# Patient Record
Sex: MALE | Race: WHITE | URBAN - METROPOLITAN AREA
[De-identification: names, ages, dates, MRNs, and addresses within clinical notes are randomized per-mention and may not be internally consistent; named-entity substitution may affect disease eponyms.]

---

## 2017-07-12 ENCOUNTER — HOSPITAL ENCOUNTER (OUTPATIENT)
Dept: MEDSURG UNIT | Facility: HOSPITAL | Age: 62
End: 2017-07-14
Attending: INTERNAL MEDICINE | Admitting: INTERNAL MEDICINE

## 2017-07-12 LAB
ABS NEUT (OLG): 9.2 X10(3)/MCL (ref 2.1–9.2)
ALBUMIN SERPL-MCNC: 3.5 GM/DL (ref 3.4–5)
ALBUMIN/GLOB SERPL: 1 RATIO (ref 1–2)
ALP SERPL-CCNC: 68 UNIT/L (ref 45–117)
ALT SERPL-CCNC: 15 UNIT/L (ref 12–78)
ANISOCYTOSIS BLD QL SMEAR: NORMAL
AST SERPL-CCNC: 14 UNIT/L (ref 15–37)
BASOPHILS # BLD AUTO: 0.05 X10(3)/MCL
BASOPHILS NFR BLD AUTO: 0 % (ref 0–1)
BILIRUB SERPL-MCNC: 0.4 MG/DL (ref 0.2–1)
BILIRUBIN DIRECT+TOT PNL SERPL-MCNC: 0.1 MG/DL
BILIRUBIN DIRECT+TOT PNL SERPL-MCNC: 0.3 MG/DL
BNP BLD-MCNC: 65.2 PG/ML (ref 0–100)
BUN SERPL-MCNC: 10 MG/DL (ref 7–18)
CALCIUM SERPL-MCNC: 8.3 MG/DL (ref 8.5–10.1)
CHLORIDE SERPL-SCNC: 106 MMOL/L (ref 98–107)
CK MB SERPL-MCNC: 1.3 NG/ML (ref 1–3.6)
CK SERPL-CCNC: 145 UNIT/L (ref 39–308)
CO2 SERPL-SCNC: 26 MMOL/L (ref 21–32)
CREAT SERPL-MCNC: 1 MG/DL (ref 0.6–1.3)
CROSSMATCH INTERPRETATION: NORMAL
EOSINOPHIL # BLD AUTO: 0.33 X10(3)/MCL
EOSINOPHIL NFR BLD AUTO: 3 % (ref 0–5)
ERYTHROCYTE [DISTWIDTH] IN BLOOD BY AUTOMATED COUNT: 18.3 % (ref 11.5–14.5)
FERRITIN SERPL-MCNC: 2.7 NG/ML (ref 26–388)
GLOBULIN SER-MCNC: 3 GM/ML (ref 2.3–3.5)
GLUCOSE SERPL-MCNC: 96 MG/DL (ref 74–106)
GROUP & RH: NORMAL
HCT VFR BLD AUTO: 22.6 % (ref 40–51)
HGB BLD-MCNC: 6 GM/DL (ref 13.5–17.5)
IMM GRANULOCYTES # BLD AUTO: 0.04 10*3/UL
IMM GRANULOCYTES NFR BLD AUTO: 0 %
INR PPP: 1.06 (ref 0.9–1.2)
IRON SATN MFR SERPL: 2.7 % (ref 15–50)
IRON SERPL-MCNC: 10 MCG/DL (ref 65–175)
LYMPHOCYTES # BLD AUTO: 1.33 X10(3)/MCL
LYMPHOCYTES NFR BLD AUTO: 11 % (ref 15–40)
MCH RBC QN AUTO: 17.2 PG (ref 26–34)
MCHC RBC AUTO-ENTMCNC: 26.5 GM/DL (ref 31–37)
MCV RBC AUTO: 64.9 FL (ref 80–100)
MICROCYTES BLD QL SMEAR: NORMAL
MONOCYTES # BLD AUTO: 0.88 X10(3)/MCL
MONOCYTES NFR BLD AUTO: 7 % (ref 4–12)
NEUTROPHILS # BLD AUTO: 9.2 X10(3)/MCL
NEUTROPHILS NFR BLD AUTO: 78 X10(3)/MCL
PLATELET # BLD AUTO: 267 X10(3)/MCL (ref 130–400)
PLATELET # BLD EST: ADEQUATE 10*3/UL
PMV BLD AUTO: 10.1 FL (ref 7.4–10.4)
POC TROPONIN: 0 NG/ML (ref 0–0.08)
POLYCHROMASIA BLD QL SMEAR: NORMAL
POTASSIUM SERPL-SCNC: 2.9 MMOL/L (ref 3.5–5.1)
PRODUCT READY: NORMAL
PROT SERPL-MCNC: 6.5 GM/DL (ref 6.4–8.2)
PROTHROMBIN TIME: 13.6 SECOND(S) (ref 11.9–14.4)
RBC # BLD AUTO: 3.48 X10(6)/MCL (ref 4.5–5.9)
RBC MORPH BLD: NORMAL
SODIUM SERPL-SCNC: 140 MMOL/L (ref 136–145)
T4 FREE SERPL-MCNC: 0.97 NG/DL (ref 0.76–1.46)
TIBC SERPL-MCNC: 368 MCG/DL (ref 250–450)
TRANSFERRIN SERPL-MCNC: 277 MG/DL (ref 200–360)
TSH SERPL-ACNC: 2.72 MIU/L (ref 0.36–3.74)
WBC # SPEC AUTO: 11.8 X10(3)/MCL (ref 4.5–11)

## 2017-07-13 LAB
ABS NEUT (OLG): 5.43 X10(3)/MCL (ref 2.1–9.2)
ABS NEUT (OLG): 5.67 X10(3)/MCL (ref 2.1–9.2)
AMPHET UR QL SCN: POSITIVE
APPEARANCE, UA: CLEAR
BACTERIA #/AREA URNS AUTO: ABNORMAL /[HPF]
BARBITURATE SCN PRESENT UR: NEGATIVE
BASOPHILS # BLD AUTO: 0.08 X10(3)/MCL
BASOPHILS # BLD AUTO: 0.08 X10(3)/MCL
BASOPHILS NFR BLD AUTO: 1 % (ref 0–1)
BASOPHILS NFR BLD AUTO: 1 % (ref 0–1)
BENZODIAZ UR QL SCN: NEGATIVE
BILIRUB UR QL STRIP: NEGATIVE
BUN SERPL-MCNC: 9 MG/DL (ref 7–18)
CALCIUM SERPL-MCNC: 7.8 MG/DL (ref 8.5–10.1)
CANNABINOIDS UR QL SCN: POSITIVE
CHLORIDE SERPL-SCNC: 111 MMOL/L (ref 98–107)
CO2 SERPL-SCNC: 26 MMOL/L (ref 21–32)
COCAINE UR QL SCN: NEGATIVE
COLOR UR: ABNORMAL
CREAT SERPL-MCNC: 0.8 MG/DL (ref 0.6–1.3)
CROSSMATCH INTERPRETATION: NORMAL
EOSINOPHIL # BLD AUTO: 0.27 X10(3)/MCL
EOSINOPHIL # BLD AUTO: 0.3 X10(3)/MCL
EOSINOPHIL NFR BLD AUTO: 4 % (ref 0–5)
EOSINOPHIL NFR BLD AUTO: 4 % (ref 0–5)
ERYTHROCYTE [DISTWIDTH] IN BLOOD BY AUTOMATED COUNT: 22 % (ref 11.5–14.5)
ERYTHROCYTE [DISTWIDTH] IN BLOOD BY AUTOMATED COUNT: 22.7 % (ref 11.5–14.5)
GLUCOSE (UA): NORMAL
GLUCOSE SERPL-MCNC: 89 MG/DL (ref 74–106)
HCT VFR BLD AUTO: 25.4 % (ref 40–51)
HCT VFR BLD AUTO: 31.4 % (ref 40–51)
HGB BLD-MCNC: 7.3 GM/DL (ref 13.5–17.5)
HGB BLD-MCNC: 9.5 GM/DL (ref 13.5–17.5)
HGB UR QL STRIP: NEGATIVE
HYALINE CASTS #/AREA URNS LPF: ABNORMAL /[LPF]
IMM GRANULOCYTES # BLD AUTO: 0.02 10*3/UL
IMM GRANULOCYTES # BLD AUTO: 0.04 10*3/UL
IMM GRANULOCYTES NFR BLD AUTO: 0 %
IMM GRANULOCYTES NFR BLD AUTO: 0 %
KETONES UR QL STRIP: NEGATIVE
LEUKOCYTE ESTERASE UR QL STRIP: NEGATIVE
LYMPHOCYTES # BLD AUTO: 1.07 X10(3)/MCL
LYMPHOCYTES # BLD AUTO: 1.13 X10(3)/MCL
LYMPHOCYTES NFR BLD AUTO: 14 % (ref 15–40)
LYMPHOCYTES NFR BLD AUTO: 14 % (ref 15–40)
MCH RBC QN AUTO: 20.1 PG (ref 26–34)
MCH RBC QN AUTO: 21.9 PG (ref 26–34)
MCHC RBC AUTO-ENTMCNC: 28.7 GM/DL (ref 31–37)
MCHC RBC AUTO-ENTMCNC: 30.3 GM/DL (ref 31–37)
MCV RBC AUTO: 70 FL (ref 80–100)
MCV RBC AUTO: 72.4 FL (ref 80–100)
MONOCYTES # BLD AUTO: 0.74 X10(3)/MCL
MONOCYTES # BLD AUTO: 0.77 X10(3)/MCL
MONOCYTES NFR BLD AUTO: 10 % (ref 4–12)
MONOCYTES NFR BLD AUTO: 9 % (ref 4–12)
NEUTROPHILS # BLD AUTO: 5.43 X10(3)/MCL
NEUTROPHILS # BLD AUTO: 5.67 X10(3)/MCL
NEUTROPHILS NFR BLD AUTO: 71 X10(3)/MCL
NEUTROPHILS NFR BLD AUTO: 71 X10(3)/MCL
NITRITE UR QL STRIP: NEGATIVE
OPIATES UR QL SCN: NEGATIVE
PCP UR QL: NEGATIVE
PH UR STRIP.AUTO: 5.5 [PH] (ref 5–8)
PH UR STRIP: 5.5 [PH] (ref 4.5–8)
PLATELET # BLD AUTO: 217 X10(3)/MCL (ref 130–400)
PLATELET # BLD AUTO: 229 X10(3)/MCL (ref 130–400)
PMV BLD AUTO: 10.3 FL (ref 7.4–10.4)
PMV BLD AUTO: 10.9 FL (ref 7.4–10.4)
POTASSIUM SERPL-SCNC: 3.8 MMOL/L (ref 3.5–5.1)
PRODUCT READY: NORMAL
PROT UR QL STRIP: NEGATIVE
RBC # BLD AUTO: 3.63 X10(6)/MCL (ref 4.5–5.9)
RBC # BLD AUTO: 4.34 X10(6)/MCL (ref 4.5–5.9)
RBC #/AREA URNS AUTO: ABNORMAL /[HPF]
SODIUM SERPL-SCNC: 145 MMOL/L (ref 136–145)
SP GR UR STRIP: 1 (ref 1–1.03)
SQUAMOUS #/AREA URNS LPF: ABNORMAL /[LPF]
TEMPERATURE, URINE (OHS): 22 DEGC (ref 20–25)
TRANSFUSION ORDER: NORMAL
UROBILINOGEN UR STRIP-ACNC: NORMAL
WBC # SPEC AUTO: 7.7 X10(3)/MCL (ref 4.5–11)
WBC # SPEC AUTO: 7.9 X10(3)/MCL (ref 4.5–11)
WBC #/AREA URNS AUTO: ABNORMAL /HPF

## 2017-07-14 LAB
ABS NEUT (OLG): 4.64 X10(3)/MCL (ref 2.1–9.2)
ALBUMIN SERPL-MCNC: 3 GM/DL (ref 3.4–5)
ALBUMIN/GLOB SERPL: 1 RATIO (ref 1–2)
ALP SERPL-CCNC: 64 UNIT/L (ref 45–117)
ALT SERPL-CCNC: 17 UNIT/L (ref 12–78)
AST SERPL-CCNC: 15 UNIT/L (ref 15–37)
BASOPHILS # BLD AUTO: 0.07 X10(3)/MCL
BASOPHILS NFR BLD AUTO: 1 % (ref 0–1)
BILIRUB SERPL-MCNC: 0.6 MG/DL (ref 0.2–1)
BILIRUBIN DIRECT+TOT PNL SERPL-MCNC: 0.2 MG/DL
BILIRUBIN DIRECT+TOT PNL SERPL-MCNC: 0.4 MG/DL
BUN SERPL-MCNC: 5 MG/DL (ref 7–18)
CALCIUM SERPL-MCNC: 8 MG/DL (ref 8.5–10.1)
CHLORIDE SERPL-SCNC: 114 MMOL/L (ref 98–107)
CO2 SERPL-SCNC: 24 MMOL/L (ref 21–32)
CREAT SERPL-MCNC: 0.9 MG/DL (ref 0.6–1.3)
EOSINOPHIL # BLD AUTO: 0.32 X10(3)/MCL
EOSINOPHIL NFR BLD AUTO: 5 % (ref 0–5)
ERYTHROCYTE [DISTWIDTH] IN BLOOD BY AUTOMATED COUNT: 23.1 % (ref 11.5–14.5)
GLOBULIN SER-MCNC: 2.6 GM/ML (ref 2.3–3.5)
GLUCOSE SERPL-MCNC: 86 MG/DL (ref 74–106)
HCT VFR BLD AUTO: 33.1 % (ref 40–51)
HGB BLD-MCNC: 9.9 GM/DL (ref 13.5–17.5)
IMM GRANULOCYTES # BLD AUTO: 0.04 10*3/UL
IMM GRANULOCYTES NFR BLD AUTO: 1 %
LYMPHOCYTES # BLD AUTO: 0.98 X10(3)/MCL
LYMPHOCYTES NFR BLD AUTO: 14 % (ref 15–40)
MCH RBC QN AUTO: 21.8 PG (ref 26–34)
MCHC RBC AUTO-ENTMCNC: 29.9 GM/DL (ref 31–37)
MCV RBC AUTO: 72.9 FL (ref 80–100)
MONOCYTES # BLD AUTO: 0.69 X10(3)/MCL
MONOCYTES NFR BLD AUTO: 10 % (ref 4–12)
NEUTROPHILS # BLD AUTO: 4.64 X10(3)/MCL
NEUTROPHILS NFR BLD AUTO: 69 X10(3)/MCL
PLATELET # BLD AUTO: 207 X10(3)/MCL (ref 130–400)
PMV BLD AUTO: 10.1 FL (ref 7.4–10.4)
POTASSIUM SERPL-SCNC: 4.3 MMOL/L (ref 3.5–5.1)
PROT SERPL-MCNC: 5.6 GM/DL (ref 6.4–8.2)
RBC # BLD AUTO: 4.54 X10(6)/MCL (ref 4.5–5.9)
SODIUM SERPL-SCNC: 145 MMOL/L (ref 136–145)
WBC # SPEC AUTO: 6.7 X10(3)/MCL (ref 4.5–11)

## 2017-07-26 ENCOUNTER — HISTORICAL (OUTPATIENT)
Dept: ADMINISTRATIVE | Facility: HOSPITAL | Age: 62
End: 2017-07-26

## 2017-07-26 LAB
ABS NEUT (OLG): 5.19 X10(3)/MCL (ref 2.1–9.2)
BASOPHILS # BLD AUTO: 0.11 X10(3)/MCL
BASOPHILS NFR BLD AUTO: 2 % (ref 0–1)
BUN SERPL-MCNC: 13 MG/DL (ref 7–18)
CALCIUM SERPL-MCNC: 9.1 MG/DL (ref 8.5–10.1)
CHLORIDE SERPL-SCNC: 108 MMOL/L (ref 98–107)
CO2 SERPL-SCNC: 30 MMOL/L (ref 21–32)
CREAT SERPL-MCNC: 1 MG/DL (ref 0.6–1.3)
EOSINOPHIL # BLD AUTO: 0.29 10*3/UL
EOSINOPHIL NFR BLD AUTO: 4 % (ref 0–5)
ERYTHROCYTE [DISTWIDTH] IN BLOOD BY AUTOMATED COUNT: 27.3 % (ref 11.5–14.5)
GLUCOSE SERPL-MCNC: 85 MG/DL (ref 74–106)
HCT VFR BLD AUTO: 43.8 % (ref 40–51)
HGB BLD-MCNC: 12.9 GM/DL (ref 13.5–17.5)
IMM GRANULOCYTES # BLD AUTO: 0.02 10*3/UL
IMM GRANULOCYTES NFR BLD AUTO: 0 %
LYMPHOCYTES # BLD AUTO: 1.06 X10(3)/MCL
LYMPHOCYTES NFR BLD AUTO: 15 % (ref 15–40)
MCH RBC QN AUTO: 22.3 PG (ref 26–34)
MCHC RBC AUTO-ENTMCNC: 29.5 GM/DL (ref 31–37)
MCV RBC AUTO: 75.6 FL (ref 80–100)
MONOCYTES # BLD AUTO: 0.6 X10(3)/MCL
MONOCYTES NFR BLD AUTO: 8 % (ref 4–12)
NEUTROPHILS # BLD AUTO: 5.19 X10(3)/MCL
NEUTROPHILS NFR BLD AUTO: 71 X10(3)/MCL
PLATELET # BLD AUTO: 267 X10(3)/MCL (ref 130–400)
PMV BLD AUTO: 10.5 FL (ref 7.4–10.4)
POTASSIUM SERPL-SCNC: 4 MMOL/L (ref 3.5–5.1)
RBC # BLD AUTO: 5.79 X10(6)/MCL (ref 4.5–5.9)
SODIUM SERPL-SCNC: 145 MMOL/L (ref 136–145)
WBC # SPEC AUTO: 7.3 X10(3)/MCL (ref 4.5–11)

## 2022-04-10 ENCOUNTER — HISTORICAL (OUTPATIENT)
Dept: ADMINISTRATIVE | Facility: HOSPITAL | Age: 67
End: 2022-04-10

## 2022-04-28 VITALS
OXYGEN SATURATION: 95 % | SYSTOLIC BLOOD PRESSURE: 117 MMHG | BODY MASS INDEX: 19.74 KG/M2 | WEIGHT: 145.75 LBS | HEIGHT: 72 IN | DIASTOLIC BLOOD PRESSURE: 83 MMHG

## 2022-04-30 NOTE — H&P
Patient:   Samy Soriano            MRN: [id number]            FIN: [id number]               Age:   61 years     Sex:  Male     :  1955   Associated Diagnoses:   None   Author:   Minh Martins MD      Basic Information   Source of history:  Self.       Chief Complaint     Fatigue and weight loss x 6 months      History of Present Illness   Admit Info:  61 year old male presents to the ED with fatigue, weight loss, and decreased appetite for six months duration. FOBT in the ER was positive. He was seen earlier today by Dr. Rahman and was told his H/H was 6.0 and 22.6 respectively. He reports dizziness and occasioanl light-headdedness. He denies and history of syncope or falls. He denies chest pain, bloody stool, N/V/D, hamatemesis, SOB, fever, night sweats, and chills.     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    Eye:  No recent visual problem.    Ear/Nose/Mouth/Throat:  No sore throat.    Respiratory:  No shortness of breath, No cough, No hemoptysis.    Cardiovascular:  No chest pain, No bradycardia.    Gastrointestinal:  No nausea, No vomiting, No diarrhea.    Genitourinary:  No dysuria.    Musculoskeletal:  No muscle pain.    Neurologic:  Alert and oriented X4.    Psychiatric:  No anxiety.       Health Status   Current medications:  (Selected)   Inpatient Medications  Ordered  K-Dur 20 oral tablet, extended release: 40 mEq, form: Tab-ER, Oral, BID, first dose 17 9:00:00 CDT, 24,034  K-Dur 20 oral tablet, extended release: 60 mEq, form: Tab-ER, Oral, Once, first dose 17 23:00:00 CDT, stop date 17 23:00:00 CDT, 1,023,851  Protonix: 40 mg, form: Tab-EC, Oral, Daily, first dose 17 9:00:00 CDT,  1,044,599  Zofran: 4 mg, form: Injection, IV Push, q4hr PRN for nausea, first dose 07/12/17 22:14:00 CDT, choose first if ordered with other treatments for nausea, 26,051  acetaminophen: 1,000 mg, form: Tab, Oral, q6hr PRN for pain, first dose 07/12/17 22:14:00 CDT, mild/moderate, 30,022  acetaminophen: 650 mg, form: Tab, Oral, q6hr PRN for fever, first dose 07/12/17 22:14:00 CDT, > 38.1 degrees Celsius, 30,022  ferrous gluconate 324 mg oral tablet: 324 mg, form: Tab, Oral, BID, first dose 07/13/17 9:00:00 CDT, 24,034      Physical Examination   Vital Signs   7/12/2017 19:51 CDT      Temperature Oral          37.1 DegC                             Peripheral Pulse Rate     88 bpm                             Respiratory Rate          20 br/min                             SpO2                      97 %                             Oxygen Therapy            Room air                             Systolic Blood Pressure   130 mmHg                             Diastolic Blood Pressure  76 mmHg     General:  Alert and oriented, No acute distress.    Eye:  Pupils are equal, round and reactive to light, pale conjunctiva.    HENT:  Normocephalic, Oral mucosa is moist.    Neck:  Supple, Non-tender.    Respiratory:  Lungs are clear to auscultation, Respirations are non-labored.    Cardiovascular:  Normal rate, Regular rhythm.    Gastrointestinal:  Soft, Non-tender, Non-distended.    Genitourinary:  No costovertebral angle tenderness.    Musculoskeletal:  Normal range of motion, Normal strength.    Integumentary:  Warm.    Neurologic:  Alert, Oriented.    Cognition and Speech:  Oriented.    Psychiatric:  Cooperative.       Review / Management   Results review   Laboratory Results   Today's Lab Results : [facility] Discrete Results   7/12/2017 20:15 CDT      Additional Findings                                    WBC                       11.8 x10(3)/mcL  HI                             RBC                       3.48 x10(6)/mcL  LOW                              Hgb                       6.0 gm/dL  CRIT                             Hct                       22.6 %  LOW                             Platelet                  267 x10(3)/mcL                             MCV                       64.9 fL  LOW                             MCH                       17.2 pg  LOW                             MCHC                      26.5 gm/dL  LOW                             RDW                       18.3 %  HI                             MPV                       10.1 fL                             Abs Neut                  9.20 x10(3)/mcL                             Neutro Auto               78 x10(3)/mcL  NA                             Lymph Auto                11 %  LOW                             Mono Auto                 7 %                             Eos Auto                  3 %                             Abs Eos                   0.33 x10(3)/mcL  NA                             Basophil Auto             0 %                             Abs Neutro                9.20 x10(3)/mcL  NA                             Abs Lymph                 1.33 x10(3)/mcL  NA                             Abs Mono                  0.88 x10(3)/mcL  NA                             Abs Baso                  0.05 x10(3)/mcL  NA                             IG%                       0 %  NA                             IG#                       0.0400  NA                             Platelet Est              Adequate                             Anisocyte                 1+                             Microcyte                 1+                             Polychrom                 1+                             RBC Morph                 Abnormal                             PT                        13.6 second(s)                             INR                       1.06                             Sodium Lvl                140 mmol/L                             Potassium Lvl              2.9 mmol/L  LOW                             Chloride                  106 mmol/L                             CO2                       26 mmol/L                             Calcium Lvl               8.3 mg/dL  LOW                             Glucose Lvl               96 mg/dL                             BUN                       10 mg/dL                             Creatinine                1.00 mg/dL                             eGFR-AA                   98 mL/min                             eGFR-QUINN                  81 mL/min  LOW                             Bili Total                0.4 mg/dL                             Bili Direct               0.1 mg/dL                             Bili Indirect             0.3 mg/dL                             AST                       14 unit/L  LOW                             ALT                       15 unit/L                             Alk Phos                  68 unit/L                             Total Protein             6.5 gm/dL                             Albumin Lvl               3.5 gm/dL                             Globulin                  3.00 gm/mL                             A/G Ratio                 1 ratio                             BNP                       65.2 pg/mL                             Iron Lvl                  10 mcg/dL  LOW                             Transferrin               277.0 mg/dL                             TIBC                      368 mcg/dL                             Iron Sat                  2.7 %  LOW                             Ferritin Lvl              2.7 ng/mL  LOW                             Total CK                  145 unit/L                             CK MB                     1.3 ng/mL                             T4 Free                   0.97 ng/dL                             TSH                       2.720 mIU/L                             ABO/Rh                    A POS                             Antibody Screen            Negative                             Crossmatch                Compatible                             Crossmatch                Compatible                             Product Ready             2 LPC READY           Impression and Plan     1. Symptomatic Anemia  -2 units RBCs will be transfused  -FOBT+, will consult GI  -No changes on EKG  -will correct anemia and schedule colonoscopy     2. Hypokalemia  -Potassium in ER 2.9  -K-Dur 60mEq given once, continue with 40mEq bid tomorrow      3. Iron Deficiency  -iron level 10mcg/dL  -ferrous gluconate 324mg bid    4. Prophylaxis  -DVT prophylaxis with pneumatic compression stockings  -GI prophylaxis with protonix     Dispo: Pt admitted to telemetry for observation. Will plan to correct the anemia and iron deficiency with 2 units RBCs and oral ferrous gluconate. Will replace potassium and continue to monitor. Will consult GI tomorrow to schedule colonoscopy.

## 2022-04-30 NOTE — OP NOTE
DATE OF SURGERY:    07/14/2017    SURGEON:  Lisa Valentine MD    ATTENDING PHYSICIAN:  Dillon Hsieh MD    PREOPERATIVE DIAGNOSIS:  Bright red blood per rectum.    POSTOPERATIVE DIAGNOSIS:  Bright red blood per rectum, internal hemorrhoids, diverticula, sigmoid and descending colon.    COMPLICATIONS:  None.    BLOOD LOSS:  0 cc.    FINDINGS:  Diverticula in the sigmoid and descending colon, large internal hemorrhoids.    INDICATION FOR PROCEDURE:  The patient is a 61-year-old male, a new patient here at our hospital.  He originally presented to the ED with fatigue, weight loss, and decreased appetite for 6 months.  He had an FOBT done, which was positive.  GI was consulted for assistance with colonoscopy.    PROCEDURE IN DETAIL:  A brief history was obtained in the preop holding area.  Risks and benefits of colonoscopy were discussed, and informed consent was obtained.  The patient was taken to the GI suite and placed in the left lateral decubitus position.  MAC anesthesia was induced by the anesthesia team.  A digital rectal exam was performed, which was within normal limits.  A well-lubricated colonoscope was then inserted into the rectum and advanced under direct visualization to the cecum.  On entry, the only abnormality noted was several diverticula along the sigmoid and descending colon regions.  Reaching the cecum, the appendiceal orifice was identified, and a photograph was obtained.  The scope was then gently retracted from the cecum to the rectum, examining the entire extent of mucosa.  Again, only diverticula were noted on his exam.  In the rectum the scope was retroflexed to allow visualization of the anal verge.  In this region we noted several internal hemorrhoids.  The scope was then retracted from the patient's anus and the     procedure terminated.  The patient was awakened from anesthesia and transferred back to his bed on the floor in stable  condition.        ______________________________  MD COCO Richard  DD:  07/14/2017  Time:  02:50PM  DT:  07/14/2017  Time:  06:17PM  Job #:  [id number]

## 2022-04-30 NOTE — ED PROVIDER NOTES
Patient:   Samy Soriano            MRN: [id number]            FIN: [id number]               Age:   61 years     Sex:  Male     :  1955   Associated Diagnoses:   Symptomatic anemia   Author:   Noel Victoria MD      Basic Information   Time seen: Date & time 2017 20:03:00.   History source: Patient.   Arrival mode: Private vehicle.   History limitation: None.   Additional information: Chief Complaint from Nursing Triage Note : Chief Complaint   2017 19:51 CDT      Chief Complaint           weakness onset 6 mths ago. dizziness when standing. pt went to dr. swift in [place] and had blood work done. dr. swift instructed pt to go to er for low blood count. paperwork shows H & H 6.4 and 21.6 on today. pt reports last  visit 10-15 yrs ago.  .      History of Present Illness   The patient presents with 61-year-old male presents with weakness and dizziness for the past 6 months.  Patient states symptoms have been gradually getting worse.  Patient denies headache, fever, or change in vision.  Denies chest pain or shortness of breath.  Denies nausea vomiting diarrhea constipation.  Patient went to see Dr. Rahman where his H&H was noted to be 6 and 21.  Patient denies bleeding.  Patient has never had a colonoscopy and denies family history of colon cancer.  Father  of skin cancer..  The onset was chronic.  The course/duration of symptoms is constant.  The character of symptoms is generalized.  The degree at present is minimal.  Risk factors consist of smoking and age.  Prior episodes: none.  Therapy today: see nurses notes.  Associated symptoms: none.        Review of Systems   Constitutional symptoms:  Weakness, fatigue.    Skin symptoms:  Negative except as documented in HPI.   Eye symptoms:  Negative except as documented in HPI.   ENMT symptoms:  Negative except as documented in HPI.   Respiratory symptoms:  Negative except as documented in HPI.   Cardiovascular symptoms:  Negative  except as documented in HPI.   Gastrointestinal symptoms:  Negative except as documented in HPI.   Genitourinary symptoms:  Negative except as documented in HPI.   Musculoskeletal symptoms:  Negative except as documented in HPI.   Neurologic symptoms:  Negative except as documented in HPI.   Psychiatric symptoms:  Negative except as documented in HPI.   Endocrine symptoms:  Negative except as documented in HPI.   Hematologic/Lymphatic symptoms:  Negative except as documented in HPI.   Allergy/immunologic symptoms:  Negative except as documented in HPI.      Health Status   Allergies:    Allergic Reactions (Selected)  No Known Allergies,    Allergies (1) Active Reaction  No Known Allergies None Documented  .      Past Medical/ Family/ Social History   Medical history:    No active or resolved past medical history items have been selected or recorded., Reviewed as documented in chart.   Surgical history:    No active procedure history items have been selected or recorded., Reviewed as documented in chart.   Family history:    No family history items have been selected or recorded., Reviewed as documented in chart.   Social history: Alcohol use: Denies, Tobacco use: Regularly, Drug use: Denies, Family/social situation: Intact family.   Problem list:    Active Problems (1)  Tobacco user   .      Physical Examination               Vital Signs      Vital Signs (last 24 hrs)_____  Last Charted___________  Temp Oral     37.1 DegC  (JUL 12 19:51)  Heart Rate Peripheral   88 bpm  (JUL 12 19:51)  Resp Rate         20 br/min  (JUL 12 19:51)  SBP      130 mmHg  (JUL 12 19:51)  DBP      76 mmHg  (JUL 12 19:51)  SpO2      97 %  (JUL 12 19:51)  Weight      67.8 kg  (JUL 12 19:51)  Height      184 cm  (JUL 12 19:51)  BMI      20.03  (JUL 12 19:51)  .   General:  Alert, no acute distress.    Skin:  Warm, dry.    Head:  Normocephalic.   Neck:  Supple.   Eye:  Normal conjunctiva.   Ears, nose, mouth and throat:  Oral mucosa moist.    Cardiovascular:  Regular rate and rhythm, No edema.    Respiratory:  Lungs are clear to auscultation, respirations are non-labored.    Chest wall:  No tenderness.   Back:  Nontender.   Musculoskeletal:  Normal ROM.   Gastrointestinal:  Soft, Nontender, Non distended, Normal bowel sounds, Rectal-normal tone.  FOBT positive.  No blood on the glove..    Neurological:  No focal neurological deficit observed.   Lymphatics:  No lymphadenopathy.   Psychiatric:  Cooperative, appropriate mood & affect.       Medical Decision Making   Documents reviewed:  Emergency department nurses' notes, prior records.    Electrocardiogram:  Time 7/12/2017 20:08:00, rate 86, normal sinus rhythm, No ST-T changes, no ectopy, normal OK & QRS intervals, EP Interp.    Results review:  Lab results : Lab View   7/12/2017 20:15 CDT      Additional Findings           7/12/2017 20:24 CDT      POC Troponin              0.00 ng/mL    7/12/2017 20:15 CDT      Sodium Lvl                140 mmol/L                             Potassium Lvl             2.9 mmol/L  LOW                             Chloride                  106 mmol/L                             CO2                       26 mmol/L                             Calcium Lvl               8.3 mg/dL  LOW                             Glucose Lvl               96 mg/dL                             BUN                       10 mg/dL                             Creatinine                1.00 mg/dL                             eGFR-AA                   98 mL/min                             eGFR-QUINN                  81 mL/min  LOW                             Bili Total                0.4 mg/dL                             Bili Direct               0.1 mg/dL                             Bili Indirect             0.3 mg/dL                             AST                       14 unit/L  LOW                             ALT                       15 unit/L                             Alk Phos                  68  unit/L                             Total Protein             6.5 gm/dL                             Albumin Lvl               3.5 gm/dL                             Globulin                  3.00 gm/mL                             A/G Ratio                 1 ratio                             BNP                       65.2 pg/mL                             Iron Lvl                  10 mcg/dL  LOW                             Transferrin               277.0 mg/dL                             TIBC                      368 mcg/dL                             Iron Sat                  2.7 %  LOW                             Ferritin Lvl              2.7 ng/mL  LOW                             Total CK                  145 unit/L                             CK MB                     1.3 ng/mL                             T4 Free                   0.97 ng/dL                             TSH                       2.720 mIU/L                             PT                        13.6 second(s)                             INR                       1.06                             WBC                       11.8 x10(3)/mcL  HI                             RBC                       3.48 x10(6)/mcL  LOW                             Hgb                       6.0 gm/dL  CRIT                             Hct                       22.6 %  LOW                             Platelet                  267 x10(3)/mcL                             MCV                       64.9 fL  LOW                             MCH                       17.2 pg  LOW                             MCHC                      26.5 gm/dL  LOW                             RDW                       18.3 %  HI                             MPV                       10.1 fL                             Abs Neut                  9.20 x10(3)/mcL                             Neutro Auto               78 x10(3)/mcL  NA                             Lymph Auto                11 %  LOW                              Mono Auto                 7 %                             Eos Auto                  3 %                             Abs Eos                   0.33 x10(3)/mcL  NA                             Basophil Auto             0 %                             Abs Neutro                9.20 x10(3)/mcL  NA                             Abs Lymph                 1.33 x10(3)/mcL  NA                             Abs Mono                  0.88 x10(3)/mcL  NA                             Abs Baso                  0.05 x10(3)/mcL  NA                             IG%                       0 %  NA                             IG#                       0.0400  NA                             Platelet Est              Adequate                             Anisocyte                 1+                             Microcyte                 1+                             Polychrom                 1+                             RBC Morph                 Abnormal                             ABO/Rh                    A POS  ,    No qualifying data available.       Reexamination/ Reevaluation   Course: progressing as expected.      Impression and Plan   Diagnosis   Symptomatic anemia ([id number] D64.9)      Calls-Consults   -  7/12/2017 21:10:00 , Im on call.    Plan   Condition: Stable.    Disposition: Admit time  7/12/2017 21:11:00, Place in Observation Unit, Dispositioned by: Time: 7/12/2017 20:18:00, Noel Victoria MD.    Counseled: Patient, Friend, Regarding diagnosis, Regarding diagnostic results, Regarding treatment plan, Regarding prescription, Patient indicated understanding of instructions.

## 2022-04-30 NOTE — DISCHARGE SUMMARY
Patient:   Samy Soriano            MRN: [id number]            FIN: [id number]               Age:   61 years     Sex:  Male     :  1955   Associated Diagnoses:   Symptomatic anemia   Author:   Kamla Matute MD      Discharge Information   Discharge Summary Information:  Admitted  2017, Discharged  2017.         Admitting physician: Sandie Jenkins MD         Discharge diagnosis: Symptomatic anemia ([id number] D64.9).      Resident Physicians: Umesh Gonzales, Gisel Britton, Galindo Samano, Kamla Matute       Physical Examination   Please see progress note on date of discharge.          Hospital Course   Hospital Course   Admitted from: from home.     Transferred via: by car.       61 year old male with PMH of aneurysm of [place] of [place] with clip presented to the ED with fatigue, weight loss, and decreased appetite for six months duration. FOBT in the ER was positive. He was seen earlier today by Dr. Rahman and was told his H/H was 6.0 and 22.6 respectively and potassium 2.9. He reports dizziness and occasional light-headedness. He denies and history of syncope or falls. He denies headache, chest pain, bloody stool, N/V/D, hematemesis, SOB, fever, night sweats, and chills. Iron study sent. H/H increased to 7.3/25.4 after 2 units of blood and up to 9.5/31.4 after another 2 units of blood. Potassium was replenished. Iron study revealed deficiency with iron 10, transferrin 277, TIBC 368 and ferritin 2.7. Colonoscopy was performed with no source of bleeding identified, diverticula in the sigmoid and descending colon, large internal hemorrhoids. Patient remained stable and tolerated PO food post-procedure. He was discharged and instructed to schedule an out-patient EGD         Discharge Plan   Discharge Summary Plan   Discharge Status: improved.     Discharge instructions given: to patient.     Discharge disposition: discharge to home.     Prescriptions: written and given to patient.        Discharge planning:   Patient was instructed to return to ED if condition worsens.   Patient is educated about iron deficiency anemia.   Patient is given prescriptions for iron, omeprazole, and albuterol.   Follow up with Dr Matute in  clinic on 7/26/17 @[id number]   To schedule an appoint for out-patient EGD   To follow up with [facility] within 1 month